# Patient Record
Sex: MALE | Race: WHITE | NOT HISPANIC OR LATINO | Employment: OTHER | URBAN - METROPOLITAN AREA
[De-identification: names, ages, dates, MRNs, and addresses within clinical notes are randomized per-mention and may not be internally consistent; named-entity substitution may affect disease eponyms.]

---

## 2022-05-02 ENCOUNTER — APPOINTMENT (OUTPATIENT)
Dept: GENERAL RADIOLOGY | Facility: CLINIC | Age: 72
End: 2022-05-02
Attending: EMERGENCY MEDICINE
Payer: MEDICARE

## 2022-05-02 ENCOUNTER — APPOINTMENT (OUTPATIENT)
Dept: CT IMAGING | Facility: CLINIC | Age: 72
End: 2022-05-02
Attending: EMERGENCY MEDICINE
Payer: MEDICARE

## 2022-05-02 ENCOUNTER — APPOINTMENT (OUTPATIENT)
Dept: ULTRASOUND IMAGING | Facility: CLINIC | Age: 72
End: 2022-05-02
Attending: EMERGENCY MEDICINE
Payer: MEDICARE

## 2022-05-02 ENCOUNTER — HOSPITAL ENCOUNTER (EMERGENCY)
Facility: CLINIC | Age: 72
Discharge: HOME | End: 2022-05-02
Attending: EMERGENCY MEDICINE | Admitting: EMERGENCY MEDICINE
Payer: MEDICARE

## 2022-05-02 VITALS
TEMPERATURE: 98.9 F | OXYGEN SATURATION: 99 % | HEART RATE: 74 BPM | SYSTOLIC BLOOD PRESSURE: 118 MMHG | RESPIRATION RATE: 12 BRPM | DIASTOLIC BLOOD PRESSURE: 75 MMHG | WEIGHT: 162 LBS

## 2022-05-02 DIAGNOSIS — M25.461 EFFUSION OF RIGHT KNEE: ICD-10-CM

## 2022-05-02 LAB
ALBUMIN SERPL-MCNC: 3.9 G/DL (ref 3.4–5)
ALBUMIN UR-MCNC: NEGATIVE MG/DL
ALP SERPL-CCNC: 88 U/L (ref 40–150)
ALT SERPL W P-5'-P-CCNC: 36 U/L (ref 0–70)
ANION GAP SERPL CALCULATED.3IONS-SCNC: 5 MMOL/L (ref 3–14)
APPEARANCE UR: CLEAR
AST SERPL W P-5'-P-CCNC: 19 U/L (ref 0–45)
BASOPHILS # BLD AUTO: 0 10E3/UL (ref 0–0.2)
BASOPHILS NFR BLD AUTO: 0 %
BILIRUB SERPL-MCNC: 0.8 MG/DL (ref 0.2–1.3)
BILIRUB UR QL STRIP: NEGATIVE
BUN SERPL-MCNC: 17 MG/DL (ref 7–30)
CALCIUM SERPL-MCNC: 9.1 MG/DL (ref 8.5–10.1)
CHLORIDE BLD-SCNC: 100 MMOL/L (ref 94–109)
CO2 SERPL-SCNC: 31 MMOL/L (ref 20–32)
COLOR UR AUTO: YELLOW
CREAT SERPL-MCNC: 0.89 MG/DL (ref 0.66–1.25)
CRP SERPL-MCNC: 9.9 MG/L (ref 0–8)
EOSINOPHIL # BLD AUTO: 0.1 10E3/UL (ref 0–0.7)
EOSINOPHIL NFR BLD AUTO: 1 %
ERYTHROCYTE [DISTWIDTH] IN BLOOD BY AUTOMATED COUNT: 11.4 % (ref 10–15)
ERYTHROCYTE [SEDIMENTATION RATE] IN BLOOD BY WESTERGREN METHOD: 9 MM/HR (ref 0–20)
GFR SERPL CREATININE-BSD FRML MDRD: >90 ML/MIN/1.73M2
GLUCOSE BLD-MCNC: 100 MG/DL (ref 70–99)
GLUCOSE UR STRIP-MCNC: NEGATIVE MG/DL
HCT VFR BLD AUTO: 44.3 % (ref 40–53)
HGB BLD-MCNC: 15.7 G/DL (ref 13.3–17.7)
HGB UR QL STRIP: NEGATIVE
IMM GRANULOCYTES # BLD: 0 10E3/UL
IMM GRANULOCYTES NFR BLD: 0 %
KETONES UR STRIP-MCNC: NEGATIVE MG/DL
LEUKOCYTE ESTERASE UR QL STRIP: NEGATIVE
LYMPHOCYTES # BLD AUTO: 1.1 10E3/UL (ref 0.8–5.3)
LYMPHOCYTES NFR BLD AUTO: 12 %
MCH RBC QN AUTO: 32.3 PG (ref 26.5–33)
MCHC RBC AUTO-ENTMCNC: 35.4 G/DL (ref 31.5–36.5)
MCV RBC AUTO: 91 FL (ref 78–100)
MONOCYTES # BLD AUTO: 0.7 10E3/UL (ref 0–1.3)
MONOCYTES NFR BLD AUTO: 7 %
MUCOUS THREADS #/AREA URNS LPF: PRESENT /LPF
NEUTROPHILS # BLD AUTO: 7.6 10E3/UL (ref 1.6–8.3)
NEUTROPHILS NFR BLD AUTO: 80 %
NITRATE UR QL: NEGATIVE
NRBC # BLD AUTO: 0 10E3/UL
NRBC BLD AUTO-RTO: 0 /100
PH UR STRIP: 7 [PH] (ref 5–7)
PLATELET # BLD AUTO: 180 10E3/UL (ref 150–450)
POTASSIUM BLD-SCNC: 4.3 MMOL/L (ref 3.4–5.3)
PROT SERPL-MCNC: 7.4 G/DL (ref 6.8–8.8)
RBC # BLD AUTO: 4.86 10E6/UL (ref 4.4–5.9)
RBC URINE: 0 /HPF
SODIUM SERPL-SCNC: 136 MMOL/L (ref 133–144)
SP GR UR STRIP: 1.01 (ref 1–1.03)
TROPONIN I SERPL HS-MCNC: 4 NG/L
URATE SERPL-MCNC: 3.8 MG/DL (ref 3.5–7.2)
UROBILINOGEN UR STRIP-MCNC: NORMAL MG/DL
WBC # BLD AUTO: 9.6 10E3/UL (ref 4–11)
WBC URINE: 0 /HPF

## 2022-05-02 PROCEDURE — 93005 ELECTROCARDIOGRAM TRACING: CPT | Performed by: EMERGENCY MEDICINE

## 2022-05-02 PROCEDURE — 36415 COLL VENOUS BLD VENIPUNCTURE: CPT | Performed by: EMERGENCY MEDICINE

## 2022-05-02 PROCEDURE — 73562 X-RAY EXAM OF KNEE 3: CPT | Mod: RT

## 2022-05-02 PROCEDURE — 85025 COMPLETE CBC W/AUTO DIFF WBC: CPT | Performed by: EMERGENCY MEDICINE

## 2022-05-02 PROCEDURE — 80053 COMPREHEN METABOLIC PANEL: CPT | Performed by: EMERGENCY MEDICINE

## 2022-05-02 PROCEDURE — 99285 EMERGENCY DEPT VISIT HI MDM: CPT | Mod: 25 | Performed by: EMERGENCY MEDICINE

## 2022-05-02 PROCEDURE — 84550 ASSAY OF BLOOD/URIC ACID: CPT | Performed by: EMERGENCY MEDICINE

## 2022-05-02 PROCEDURE — G1004 CDSM NDSC: HCPCS

## 2022-05-02 PROCEDURE — 250N000011 HC RX IP 250 OP 636: Performed by: EMERGENCY MEDICINE

## 2022-05-02 PROCEDURE — 85652 RBC SED RATE AUTOMATED: CPT | Performed by: EMERGENCY MEDICINE

## 2022-05-02 PROCEDURE — 93971 EXTREMITY STUDY: CPT | Mod: RT

## 2022-05-02 PROCEDURE — 86140 C-REACTIVE PROTEIN: CPT | Performed by: EMERGENCY MEDICINE

## 2022-05-02 PROCEDURE — 96374 THER/PROPH/DIAG INJ IV PUSH: CPT | Mod: 59 | Performed by: EMERGENCY MEDICINE

## 2022-05-02 PROCEDURE — 93010 ELECTROCARDIOGRAM REPORT: CPT | Performed by: EMERGENCY MEDICINE

## 2022-05-02 PROCEDURE — 84484 ASSAY OF TROPONIN QUANT: CPT | Performed by: EMERGENCY MEDICINE

## 2022-05-02 PROCEDURE — 250N000009 HC RX 250: Performed by: EMERGENCY MEDICINE

## 2022-05-02 PROCEDURE — 81003 URINALYSIS AUTO W/O SCOPE: CPT | Performed by: EMERGENCY MEDICINE

## 2022-05-02 RX ORDER — METHYLPREDNISOLONE 4 MG
TABLET, DOSE PACK ORAL
COMMUNITY
Start: 2022-04-29

## 2022-05-02 RX ORDER — OXYCODONE HYDROCHLORIDE 5 MG/1
5 TABLET ORAL EVERY 6 HOURS PRN
Qty: 12 TABLET | Refills: 0 | Status: SHIPPED | OUTPATIENT
Start: 2022-05-02 | End: 2022-05-05

## 2022-05-02 RX ORDER — ALFUZOSIN HYDROCHLORIDE 10 MG/1
1 TABLET, EXTENDED RELEASE ORAL DAILY
COMMUNITY
Start: 2022-03-22

## 2022-05-02 RX ORDER — IOPAMIDOL 755 MG/ML
500 INJECTION, SOLUTION INTRAVASCULAR ONCE
Status: COMPLETED | OUTPATIENT
Start: 2022-05-02 | End: 2022-05-02

## 2022-05-02 RX ORDER — HYDROMORPHONE HCL IN WATER/PF 6 MG/30 ML
0.2 PATIENT CONTROLLED ANALGESIA SYRINGE INTRAVENOUS
Status: COMPLETED | OUTPATIENT
Start: 2022-05-02 | End: 2022-05-02

## 2022-05-02 RX ADMIN — SODIUM CHLORIDE 70 ML: 9 INJECTION, SOLUTION INTRAVENOUS at 08:25

## 2022-05-02 RX ADMIN — IOPAMIDOL 85 ML: 755 INJECTION, SOLUTION INTRAVENOUS at 08:25

## 2022-05-02 RX ADMIN — HYDROMORPHONE HYDROCHLORIDE 0.2 MG: 0.2 INJECTION, SOLUTION INTRAMUSCULAR; INTRAVENOUS; SUBCUTANEOUS at 07:59

## 2022-05-02 NOTE — ED PROVIDER NOTES
History     Chief Complaint   Patient presents with     Knee Pain     HPI  Moises Hitchcock is a 71 year old male who presents to the emergency room for knee pain, chest pain, and arm pain.  Is mainly here for acute onset knee pain.  Yesterday around noon had sudden onset of severe pain in his right knee.  Does not recall any injury.  Has never had problems with his knee before.  Pain is worse with flexion and extension of his knee, but not worse with weightbearing.  He also notes that his knee feels warm to touch when compared with his left side.  Has not noticed any swelling of the knee or lower extremity.  Has not been running a fever.  No numbness or tingling of the lower extremity.  He thought this potentially had to do with Lyme's disease, as he does have some hunting buddies who had sudden onset of severe symptoms that were similar.  He does not recall any tick bites.    Additionally, he notes that he has had some right-sided neck pain and chest pain that has been ongoing for the last week.  Early this morning he felt the pain go down his left arm.  He denies feeling short of breath.  He is currently undergoing treatment for prostate cancer.  Received radiation therapy over the last 1 week and is due to receive treatment in this upcoming week.  Is traveling from out of state, usually doctors in [place] where he lives.  Has not taken any medication for his symptoms as aspirin and ibuprofen upset his stomach, and one of his current chemotherapy medications stated to not take with Tylenol.    Allergies:  No Known Allergies    Problem List:    There are no problems to display for this patient.       Past Medical History:    Past Medical History:   Diagnosis Date     Prostate cancer (H)        Past Surgical History:    History reviewed. No pertinent surgical history.    Family History:    History reviewed. No pertinent family history.    Social History:  Marital Status:   [2]  Social History     Tobacco  Use     Smoking status: Former Smoker     Quit date:      Years since quittin.3     Smokeless tobacco: Never Used   Substance Use Topics     Alcohol use: Yes     Comment: 3 drinks/wk     Drug use: Never        Medications:    oxyCODONE (ROXICODONE) 5 MG tablet  alfuzosin ER (UROXATRAL) 10 MG 24 hr tablet  methylPREDNISolone (MEDROL DOSEPAK) 4 MG tablet therapy pack  omeprazole (PRILOSEC) 20 MG DR capsule          Review of Systems   All other systems reviewed and are negative.      Physical Exam   BP: 123/83  Pulse: 75  Temp: 98.9  F (37.2  C)  Resp: 18  Weight: 73.5 kg (162 lb)  SpO2: 97 %      Physical Exam  Vitals and nursing note reviewed.   Constitutional:       General: He is not in acute distress.     Appearance: He is not diaphoretic.   HENT:      Head: Atraumatic.   Eyes:      General: No scleral icterus.     Pupils: Pupils are equal, round, and reactive to light.   Cardiovascular:      Rate and Rhythm: Normal rate and regular rhythm.      Heart sounds: Normal heart sounds.   Pulmonary:      Effort: Pulmonary effort is normal. No respiratory distress.      Breath sounds: Normal breath sounds.   Abdominal:      General: Bowel sounds are normal.      Palpations: Abdomen is soft.      Tenderness: There is no abdominal tenderness.   Musculoskeletal:         General: No swelling.      Cervical back: Normal range of motion. No rigidity. Muscular tenderness present.      Right knee: No swelling, deformity, effusion, erythema or crepitus. Decreased range of motion.      Right lower leg: No edema.      Left lower leg: No edema.      Comments: Pain with flexion and extension of the knee, limiting range of motion.  Has tenderness along the medial and lateral joint line as well as anteriorly.      Homans' sign is positive   Skin:     General: Skin is warm.      Capillary Refill: Capillary refill takes less than 2 seconds.      Findings: No erythema, lesion or rash.   Neurological:      Mental Status: He is  alert.         ED Course                 Procedures              EKG Interpretation:      Interpreted by Emerald Dumont DO  Time reviewed: 0748  Symptoms at time of EKG: Knee pain, chest pain, arm pain   Rhythm: normal sinus   Rate: normal  Axis: normal  Ectopy: none  Conduction: normal  ST Segments/ T Waves: No ST-T wave changes  Q Waves: none  Comparison to prior: No old EKG available    Clinical Impression: normal EKG          Critical Care time:  none               Results for orders placed or performed during the hospital encounter of 05/02/22 (from the past 24 hour(s))   UA with Microscopic reflex to Culture    Specimen: Urine, NOS   Result Value Ref Range    Color Urine Yellow Colorless, Straw, Light Yellow, Yellow    Appearance Urine Clear Clear    Glucose Urine Negative Negative mg/dL    Bilirubin Urine Negative Negative    Ketones Urine Negative Negative mg/dL    Specific Gravity Urine 1.014 1.003 - 1.035    Blood Urine Negative Negative    pH Urine 7.0 5.0 - 7.0    Protein Albumin Urine Negative Negative mg/dL    Urobilinogen Urine Normal Normal, 2.0 mg/dL    Nitrite Urine Negative Negative    Leukocyte Esterase Urine Negative Negative    Mucus Urine Present (A) None Seen /LPF    RBC Urine 0 <=2 /HPF    WBC Urine 0 <=5 /HPF    Narrative    Urine Culture not indicated   XR Knee Right 3 Views    Narrative    RIGHT KNEE THREE VIEWS  5/2/2022 8:28 AM    INDICATION: Knee pain, no known injury.    COMPARISON: None available.      Impression    IMPRESSION: Anatomic alignment. No acute displaced fracture. No  significant joint space narrowing in the medial or lateral  compartments. Right knee joint effusion. No significant anterior right  knee soft tissue swelling.     TC SZYMANSKI MD         SYSTEM ID:  [id number]   CT Chest Pulmonary Embolism w Contrast    Narrative    CT CHEST PULMONARY EMBOLISM WITH CONTRAST 5/2/2022 8:36 AM    CLINICAL HISTORY: Chest pain.    TECHNIQUE: CT angiogram chest during  arterial phase injection IV  contrast. 2D and 3D MIP reconstructions were performed by the CT  technologist. Dose reduction techniques were used.   CONTRAST: 80mL Isovue-370    COMPARISON: None.    FINDINGS:  ANGIOGRAM CHEST: Pulmonary arteries are normal caliber and negative  for pulmonary emboli. Thoracic aorta is normal in caliber and negative  for dissection.     LUNGS AND PLEURA: The lungs are clear. No effusions.    MEDIASTINUM/AXILLAE: No thoracic or axillary adenopathy. Unremarkable  thyroid and esophagus.    CORONARY ARTERY CALCIFICATION: Mild.    UPPER ABDOMEN: Unremarkable.    MUSCULOSKELETAL: Unremarkable.      Impression    IMPRESSION:  1.  No evidence of pulmonary embolism.  2.  Mild coronary atherosclerosis.     LUIS DE JESUS MD         SYSTEM ID:  [id number]   US Lower Extremity Venous Duplex Right    Narrative    US LOWER EXTREMITY VENOUS DUPLEX RIGHT 5/2/2022 8:52 AM    CLINICAL HISTORY/INDICATION: Right knee/leg pain, concern for DVT    COMPARISON: None relevant    TECHNIQUE:   Grayscale, color-flow, and spectral waveform analysis were performed  of the deep veins of the right lower extremity    FINDINGS:   The right common femoral vein, femoral vein and popliteal vein  demonstrate normal compressibility, spectral waveform, color flow and  augmentation.    The right posterior tibial vein, peroneal vein, and greater saphenous  vein are compressible.    The contralateral left common femoral vein demonstrates normal  compressibility, spectral waveform, color flow and augmentation.      Impression    IMPRESSION:   No evidence of deep venous thrombosis in the right lower extremity    GURMEET VELEZ DO         SYSTEM ID:  [id number]       Medications   HYDROmorphone (DILAUDID) injection 0.2 mg (0.2 mg Intravenous Given 5/2/22 4609)   iopamidol (ISOVUE-370) solution 500 mL (85 mLs Intravenous Given 5/2/22 0825)   new 100 ml saline bag (70 mLs Intravenous Given 5/2/22 0825)       Assessments & Plan  (with Medical Decision Making)  Moises is a 71-year-old male presenting to the emergency room with sudden onset of right knee pain without any noted injury.  See history and focused physical exam as above  Pleasant 71-year-old male in no acute distress, vital signs are stable and he is afebrile.  Knee did not reveal any overlying lesions, erythema, or obvious swelling.  He has tenderness with palpation along the medial and lateral joint lines and anteriorly, no posterior tenderness.  He does have some mild posterior calf tenderness and Homans' sign was positive.  Strong DP pulse.  Right knee was noted to be mildly warmer than the left, but not feel hot, swollen, concern for septic joint is low.  Will check lab work and imaging, and since patient has also had some chest pain will evaluate for possible PE from suspected lower extremity DVT.  We will also check labs to evaluate for possible gout.  Discussed drawing blood work to evaluate for possible Lyme's disease, but since patient does not live locally and we would not get these results today, ultimately decided against this.  He is agreeable to this plan  Labs and imaging as above.  Other than a mild elevation in CRP, normal sed rate, remainder of labs are within normal limits.  No acute concerning findings were noted.  He did have an effusion of the right knee noted on x-ray, but DVT evaluation by lower extremity Doppler was negative.  PE study was negative.  Explained the findings to the patient.  Explained that effusion may need to be drained, but introducing a needle could also introduce infection.  He is leaving today to go back home to [place] where he primarily lives and doctors.  We had a discussion about drawing labs for tickborne illnesses here in the emergency room, but since he does not live locally and will not be around this area to follow-up, would not be beneficial to draw these labs since we will not get the results today.  I strongly encouraged  him to follow-up immediately with his primary provider upon return.  Schedule an appointment within 1 to 2 days if possible.  Would recommend checking tickborne illness panel on lab work, and would also recommend discussing joint aspiration for knee effusion with his primary provider.  If his primary provider did not feel comfortable doing this, referral to orthopedic surgery may be warranted to see if they think aspiration is appropriate.  His wife will be driving, so he was provided with a small quantity of pain medication to take home.  Discussed that if any worsening during their travels home, should immediately pull over and call 911 or stop at the nearest emergency room or urgent care.  Prompt follow-up with primary provider was again stressed.  Discharged in no distress       I have reviewed the nursing notes.    I have reviewed the findings, diagnosis, plan and need for follow up with the patient.       Discharge Medication List as of 5/2/2022 10:04 AM      START taking these medications    Details   oxyCODONE (ROXICODONE) 5 MG tablet Take 1 tablet (5 mg) by mouth every 6 hours as needed for pain, Disp-12 tablet, R-0, E-Prescribe             Final diagnoses:   Effusion of right knee       5/2/2022   [facility] EMERGENCY DEPT     Emerald Dumont DO  05/03/22 0804

## 2022-05-02 NOTE — DISCHARGE INSTRUCTIONS
Your blood work and imaging ruled out a blood clot in your lungs, in your right leg, no sign of strain on your heart or evidence of a heart attack.  Your white blood cell count was normal and you had only a very mild elevation of 1 inflammatory marker, called your CRP, but another inflammatory marker called a sed rate was normal    I am not sure what is causing your knee pain at this time.  You will need to follow-up very closely with your primary provider.  I suggest you call them today.  Inquire about blood testing for Lyme's disease.  This was not done in the emergency room since we would not get these results today, and since you do not live locally will be difficult to follow-up.  If Lyme's testing is positive, you need to be on a specific antibiotic    You should also inquire about drawing the fluid off your knee and sending for testing.  This would need to be done to rule out infectious arthritis or gout.  If there is sign of infection in your knee, you would need to be on an antibiotic for treatment.  Your primary provider may not feel comfortable performing this fluid aspiration, so you should ask if they would be willing to do this or if you need to be sent to an orthopedic doctor for an urgent referral    In the meantime, you may take 1 tablet of the oxycodone every 4-6 hours as needed for severe pain.  This medication can make you drowsy so do not drive or drink alcohol if taking this medicine.  It does not have Tylenol or ibuprofen in it, and should be okay to take with your other medications    If at any time while you are traveling home, or if you are unable to promptly see your primary provider, and you have worsening symptoms, do not hesitate to return immediately to any local emergency room for prompt evaluation

## 2022-05-02 NOTE — ED TRIAGE NOTES
Patient with R knee pain, no injury or fall. Reports in the past week he has also had intermittent R sided neck and chest/shoulder discomfort as well. Radiation therapy last week for prostate ca.      Triage Assessment     Row Name 05/02/22 0639       Triage Assessment (Adult)    Airway WDL WDL       Respiratory WDL    Respiratory WDL WDL       Skin Circulation/Temperature WDL    Skin Circulation/Temperature WDL WDL